# Patient Record
Sex: MALE | Race: WHITE | Employment: STUDENT | URBAN - METROPOLITAN AREA
[De-identification: names, ages, dates, MRNs, and addresses within clinical notes are randomized per-mention and may not be internally consistent; named-entity substitution may affect disease eponyms.]

---

## 2020-03-05 ENCOUNTER — OFFICE VISIT (OUTPATIENT)
Dept: GASTROENTEROLOGY | Age: 25
End: 2020-03-05
Payer: COMMERCIAL

## 2020-03-05 VITALS
WEIGHT: 315 LBS | DIASTOLIC BLOOD PRESSURE: 78 MMHG | BODY MASS INDEX: 37.19 KG/M2 | HEIGHT: 77 IN | SYSTOLIC BLOOD PRESSURE: 128 MMHG

## 2020-03-05 PROBLEM — R19.7 DIARRHEA: Status: ACTIVE | Noted: 2020-03-05

## 2020-03-05 PROBLEM — R93.3 ABNORMAL FINDINGS ON RADIOLOGICAL EXAMINATION OF GASTROINTESTINAL TRACT: Status: ACTIVE | Noted: 2020-03-05

## 2020-03-05 PROCEDURE — 99202 OFFICE O/P NEW SF 15 MIN: CPT | Performed by: INTERNAL MEDICINE

## 2020-03-05 RX ORDER — ONDANSETRON 4 MG/1
4 TABLET, ORALLY DISINTEGRATING ORAL
COMMUNITY
Start: 2020-03-01

## 2020-03-05 RX ORDER — DICYCLOMINE HCL 20 MG
20 TABLET ORAL
COMMUNITY
Start: 2020-03-01

## 2020-03-05 NOTE — PROGRESS NOTES
Activity    Alcohol use: No    Drug use: No    Sexual activity: Never   Lifestyle    Physical activity:     Days per week: Not on file     Minutes per session: Not on file    Stress: Not on file   Relationships    Social connections:     Talks on phone: Not on file     Gets together: Not on file     Attends Alevism service: Not on file     Active member of club or organization: Not on file     Attends meetings of clubs or organizations: Not on file     Relationship status: Not on file    Intimate partner violence:     Fear of current or ex partner: Not on file     Emotionally abused: Not on file     Physically abused: Not on file     Forced sexual activity: Not on file   Other Topics Concern    Not on file   Social History Narrative    Not on file     SURGICAL HISTORY   No past surgical history on file. CURRENT MEDICATIONS   (This list may include medications prescribed during this encounter as epic can not insert only the list prior to this encounter.)  Current Outpatient Rx   Medication Sig Dispense Refill    dicyclomine (BENTYL) 20 MG tablet Take 20 mg by mouth      ondansetron (ZOFRAN-ODT) 4 MG disintegrating tablet Take 4 mg by mouth        ALLERGIES   No Known Allergies  IMMUNIZATIONS     Immunization History   Administered Date(s) Administered    DTaP 1993, 1993, 1993, 12/15/1994, 07/24/1997    Hepatitis B 1993, 1993, 06/08/1994    Hib, unspecified 09/16/1994    Influenza Virus Vaccine 11/16/2006, 09/29/2009    MMR 09/16/1994, 07/24/1997    Meningococcal MCV4P (Menactra) 08/26/2008    Polio IPV (IPOL) 1993, 1993, 12/15/1994, 07/24/1997    Tdap (Boostrix, Adacel) 04/19/2006    Varicella (Varivax) 07/24/1997, 08/26/2008     REVIEW OF SYSTEMS     Constitutional: denies fever and unexpected weight change. HENT: Negative for ear pain, hearing loss and nosebleeds. Eyes: Negative for pain and visual disturbance.    Respiratory: Negative for cough, shortness of breath and wheezing. Cardiovascular: Negative for chest pain, palpitations and leg swelling. Gastrointestinal: see HPI for details. Endocrine: Negative for polydipsia, polyphagia and polyuria. Genitourinary: Negative for difficulty urinating, dysuria, hematuria and urgency. Musculoskeletal: Positive for arthralgias and back pain. Skin: Negative for pallor and rash. Allergic/Immunologic: Negative for environmental allergies and immunocompromised state. Neurological: Negative for seizures, syncope. Hematological: Negative for adenopathy. Does not bruise/bleed easily. Psychiatric/Behavioral: Negative for agitation, confusion, hallucinations. PHYSICAL EXAM   /78 (Site: Left Upper Arm)   Ht 6' 4.5\" (1.943 m)   Wt (!) 342 lb (155.1 kg)   BMI 41.09 kg/m²   Wt Readings from Last 3 Encounters:   03/05/20 (!) 342 lb (155.1 kg)   10/21/15 (!) 354 lb (160.6 kg)   01/28/15 (!) 303 lb (137.4 kg)     Constitutional: AAO3, No acute distress  HEENT: no pallor or icterus. Neck: supple, no adenopathy  Cardiovascular: Normal heart rate, Normal rhythm, No murmurs,. RS: Normal breath sounds, No wheezing,   Abdomen: soft, non tender, not distended, BS+. No hepatosplenomegaly. Extremities:  No edema. CT abdomen and pelvis with contrast 3/1/2020    LOWER CHEST:  Unremarkable  LIVER:  There is diffuse hepatic steatosis. No focal hepatic lesion. GALLBLADDER/BILE DUCTS:  Unremarkable. No opaque gallstones  PANCREAS:  Unremarkable. No mass or duct dilation  SPLEEN:  Unremarkable  ADRENALS:  Unremarkable    KIDNEYS/URETERS:  Unremarkable. No hydronephrosis, stone, or suspicious mass   GI TRACT:  There appears to be thickening of the transverse colon. Evaluation of this is limited, however this suggests colitis. This could be infectious in etiology such as C. difficile colitis or other infectious or less likely inflammatory colitis. Bowel is otherwise grossly unremarkable.  The appendix is